# Patient Record
Sex: FEMALE | URBAN - METROPOLITAN AREA
[De-identification: names, ages, dates, MRNs, and addresses within clinical notes are randomized per-mention and may not be internally consistent; named-entity substitution may affect disease eponyms.]

---

## 2020-09-17 ENCOUNTER — IMPORTED ENCOUNTER (OUTPATIENT)
Dept: URBAN - METROPOLITAN AREA CLINIC 9 | Facility: CLINIC | Age: 30
End: 2020-09-17

## 2021-02-23 ENCOUNTER — CLINICAL SUPPORT (OUTPATIENT)
Dept: OCCUPATIONAL MEDICINE | Facility: URGENT CARE | Age: 31
End: 2021-02-23

## 2021-02-23 DIAGNOSIS — Z02.1 PRE-EMPLOYMENT EXAMINATION: ICD-10-CM

## 2021-02-23 PROCEDURE — 97799 UNLISTED PHYSCL MED/REHAB PX: CPT | Mod: FIT | Performed by: STUDENT IN AN ORGANIZED HEALTH CARE EDUCATION/TRAINING PROGRAM

## 2021-04-18 ENCOUNTER — OFFICE VISIT (OUTPATIENT)
Dept: URGENT CARE | Facility: URGENT CARE | Age: 31
End: 2021-04-18

## 2021-04-18 VITALS
HEIGHT: 66 IN | WEIGHT: 260 LBS | SYSTOLIC BLOOD PRESSURE: 154 MMHG | RESPIRATION RATE: 16 BRPM | HEART RATE: 99 BPM | OXYGEN SATURATION: 96 % | BODY MASS INDEX: 41.78 KG/M2 | DIASTOLIC BLOOD PRESSURE: 98 MMHG | TEMPERATURE: 98.1 F

## 2021-04-18 DIAGNOSIS — R11.2 NAUSEA AND VOMITING, INTRACTABILITY OF VOMITING NOT SPECIFIED, UNSPECIFIED VOMITING TYPE: Primary | ICD-10-CM

## 2021-04-18 PROCEDURE — 99203 OFFICE O/P NEW LOW 30 MIN: CPT | Performed by: PHYSICIAN ASSISTANT

## 2021-04-18 RX ORDER — LISINOPRIL 20 MG/1
20 TABLET ORAL DAILY
COMMUNITY

## 2021-04-18 RX ORDER — ONDANSETRON HYDROCHLORIDE 8 MG/1
8 TABLET, FILM COATED ORAL EVERY 8 HOURS PRN
Qty: 20 TABLET | Refills: 1 | Status: SHIPPED | OUTPATIENT
Start: 2021-04-18 | End: 2021-04-25

## 2021-04-18 ASSESSMENT — PAIN SCALES - GENERAL: PAINLEVEL: 7

## 2021-04-19 NOTE — PROGRESS NOTES
"Subjective      Abby Brothers is a 31 y.o. female who presents complaint of nausea and vomiting that started last night and has developed watery diarrhea this morning.  Patient reports did not eat anything that would obviously cause this.  Has not eaten anything today but has had dry heaves.  Has not taken anything for symptoms.  No significant abdominal pain no fever chills.  Does not know anybody with similar symptoms..    HPI    The following have been reviewed and updated as appropriate in this visit:         Allergies   Allergen Reactions   • Penicillins Rash   • Sulfamethoxazole-Trimethoprim Rash     Current Outpatient Medications   Medication Sig Dispense Refill   • lisinopriL (PRINIVIL,ZESTRIL) 20 mg tablet Take 20 mg by mouth daily     • sertraline HCl (ZOLOFT ORAL) Take 150 mg by mouth     • ondansetron (Zofran) 8 mg tablet Take 1 tablet (8 mg total) by mouth every 8 (eight) hours as needed for nausea or vomiting for up to 7 days 20 tablet 1     No current facility-administered medications for this visit.     No past medical history on file.  Past Surgical History:   Procedure Laterality Date   • CHOLECYSTECTOMY     • TONSILLECTOMY AND ADENOIDECTOMY       No family history on file.  Social History     Occupational History   • Not on file   Tobacco Use   • Smoking status: Never Smoker   • Smokeless tobacco: Never Used   Substance and Sexual Activity   • Alcohol use: Not on file   • Drug use: Not on file   • Sexual activity: Not on file   Social History Narrative   • Not on file       Review of Systems    Objective   /98 (BP Location: Right arm, Patient Position: Sitting, Cuff Size: Large Adult)   Pulse 99   Temp 36.7 °C (98.1 °F) (Temporal)   Resp 16   Ht 1.676 m (5' 6\")   Wt 117.9 kg (260 lb)   SpO2 96%   BMI 41.97 kg/m²     Physical Exam  Vitals and nursing note reviewed.   Constitutional:       General: She is not in acute distress.     Appearance: Normal appearance. She is not " ill-appearing, toxic-appearing or diaphoretic.   Cardiovascular:      Rate and Rhythm: Normal rate and regular rhythm.      Heart sounds: Normal heart sounds.   Pulmonary:      Effort: Pulmonary effort is normal.      Breath sounds: Normal breath sounds. No wheezing, rhonchi or rales.   Abdominal:      General: Bowel sounds are normal. There is no distension.      Palpations: Abdomen is soft. There is no mass.      Tenderness: There is no abdominal tenderness. There is no guarding or rebound.   Neurological:      Mental Status: She is alert.   Psychiatric:         Mood and Affect: Mood normal.         Behavior: Behavior normal.         Thought Content: Thought content normal.         Judgment: Judgment normal.         Assessment/Plan   Diagnoses and all orders for this visit:    Nausea and vomiting, intractability of vomiting not specified, unspecified vomiting type  -     ondansetron (Zofran) 8 mg tablet; Take 1 tablet (8 mg total) by mouth every 8 (eight) hours as needed for nausea or vomiting for up to 7 days    Discussed with patient very likely viral gastroenteritis as opposed to food poisoning.  At this time will prescribe her Zofran for nausea and vomiting.  Discussed oral rehydration therapy and progressing with bland diet.  If diarrhea persists may use Imodium and/or Pepto-Bismol.  Should symptoms continue another 3 to 5 days return to clinic for further evaluation.    JOSEFA ORTIZ

## 2021-05-04 NOTE — PROGRESS NOTES
Subjective   New Dermatology Skin examination  Abby Brothers is a 31 y.o. female who presents for an examination of her face. . Lesions of concern include: swollen, red, flaky face and backs of her fingers.  Patient does endorse a history of tanning bed use.  Patient skin history: Negative for personal history of non melanoma skin cancer, Negative for actinic keratosis, Negative personal history of melanoma,  Negative family history of melanoma.            Review of Systems  Review of Systems   Constitutional: Negative for fatigue and fever.   Skin: Positive for rash.   Allergic/Immunologic: Negative for environmental allergies and immunocompromised state.   Hematological: Does not bruise/bleed easily.   All other systems reviewed and are negative.        History reviewed. No pertinent past medical history.       Past Surgical History:   Procedure Laterality Date   • CHOLECYSTECTOMY     • TONSILLECTOMY AND ADENOIDECTOMY           family history is not on file.      Medications  Current Outpatient Medications on File Prior to Visit   Medication Sig Dispense Refill   • lisinopriL (PRINIVIL,ZESTRIL) 20 mg tablet Take 20 mg by mouth daily     • sertraline HCl (ZOLOFT ORAL) Take 150 mg by mouth       No current facility-administered medications on file prior to visit.       Allergies  Penicillins and Sulfamethoxazole-trimethoprim      Physical Examination    Vital Signs /74 (BP Location: Left arm, Patient Position: Sitting, Cuff Size: Large Long Adult)   Pulse 95   Temp (!) 35.7 °C (96.3 °F) (Temporal)       Physical Exam Findings:   Constitutional: General Appearance: healthy-appearing, well-nourished, and well-developed. Level of Distress: NAD. Ambulation: ambulating normally.  Psychiatric: Insight: good judgement. Mental Status: normal mood and affect and active and alert. Orientation: to time, place, and person. Memory: recent memory normal and remote memory normal.  Head: normocephalic and atraumatic.  Eyes:  Lids and Conjunctivae: no discharge or pallor and non-injected. Lens: clear. Sclerae: non-icteric.  Neurologic: Gait and Station: normal gait and station. Cranial Nerves: grossly intact. Coordination and Cerebellum: no tremor.      A focused skin examination was performed including face, lips, mouth, ears, neck, both hands  Yang skin type:II     Face: Bright red smooth papules     Erythema with mild scale of the face and ears and the fingers between the MCPs and PIPs bilaterally.  Recommended patch testing done if the condition becomes persistent.        Abby was seen today for skin problem.    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  If it becomes more frequent she may consider patch testing.      Vanicream products recommended until it settles down.      I emphasized daily sunscreen use with an SPF of 30-50, broad spectrum and water resistant.  I directed reapplication every two hours.  I recommended wide brimmed hats.  Finally, we discussed danger signs of changing skin lesions including sores not healing and moles changing in size, shape or color.  Should any of these lesions occur before next appointment, I instructed patent to call sooner for evaluation.        No follow-ups on file.

## 2021-05-04 NOTE — PATIENT INSTRUCTIONS
"Avoid sunlight between the hours of 10 am and 2 pm, wear a broad spectrum, water resistant sunscreen with SPF of 30-50 year round. Reapply sunscreen every 2 hours and after exercising or swimming. Wearing a 6\" broad brimmed hat, a lip sunblock of SPF of 30-50, and sun protective clothing is also suggested year round.   Recommended sunscreens include Neutrogena Ultra Sheer Dry Touch SPF 50 or higher, Neutrogena Sheer Zinc Dry Touch SPF 50, and Vanicream Sport.  ---------------------------------------------------------------------  Monthly self-examination of your skin is important. Should any lesions, including moles, change in size, shape, color, itch, bleed or burn, contact our office for sooner evaluation.  ----------------------------------------------------------------------  **[employer] is our survey company. You will be receiving a survey about your care. Your satisfaction is important to us and we are always looking for opportunities to improve your experience. If you come across questions in the survey that are not applicable to your visit, please leave these questions blank. Your identity is kept confidential so please be honest! Thank you!**    Dry/Irritated Skin Care  Use comfortably cool water when taking a bath or shower.  Avoid very hot water, as this tends to irritate and further dry the skin leading to worsening of rashes and increase in itching.     Use very gentle soaps when bathing or showering.  Avoid soaps containing fragrances, dyes, aloe, lanolin, vitamin E or cocoa butter.  Soap recommendations:              Vanicream Cleansing Bar  Cetaphil Gentle Skin Cleanser              Unscented Dove              CeraVe Hydrating Cleanser  Eucerin Dry, Sensitive Skin Advanced Cleansing Body and Face Cleanser     Pat the skin dry after coming out of the bath or shower.  Do not rub or scrub vigorously with your towel.     Apply a moisturizer while the skin is still damp.  Avoid moisturizers containing " fragrances, dyes, aloe, lanolin, vitamin E or cocoa butter.    Moisturizer recommendations:              Vanicream Moisturizing Skin Cream              Vanicream Lite Lotion              Cetaphil Moisturizing Cream              CeraVe Moisturizing Cream              Aveeno [place]              Plain Vaseline  For the face: Neutrogena oil-free moisturizer, CeraVe or Cetaphil facial moisturizer that is safe for all skin types are recommended. If you have very dry skin you may need a thicker moisturizer (as listed above) for the face as well.     Apply a moisturizer 2-3 times daily.     Use unscented laundry detergent.  Avoid laundry detergents with fragrances and dyes.  Laundry detergent recommendations:                            All Free and Clear              Do not use fabric softener dryer sheets.    Recommend cotton clothing    Diagnoses and all orders for this visit:    Irritant contact dermatitis, unspecified trigger  -     hydrocortisone 2.5 % ointment; Apply topically 2 (two) times a day  -     tacrolimus (Protopic) 0.1 % ointment; Apply 1 application topically 2 (two) times a day

## 2021-05-05 ENCOUNTER — OFFICE VISIT (OUTPATIENT)
Dept: DERMATOLOGY | Facility: CLINIC | Age: 31
End: 2021-05-05
Payer: COMMERCIAL

## 2021-05-05 VITALS — SYSTOLIC BLOOD PRESSURE: 145 MMHG | TEMPERATURE: 96.3 F | DIASTOLIC BLOOD PRESSURE: 74 MMHG | HEART RATE: 95 BPM

## 2021-05-05 DIAGNOSIS — D18.01 CHERRY ANGIOMA: ICD-10-CM

## 2021-05-05 DIAGNOSIS — L24.9 IRRITANT CONTACT DERMATITIS, UNSPECIFIED TRIGGER: Primary | ICD-10-CM

## 2021-05-05 PROCEDURE — 99204 OFFICE O/P NEW MOD 45 MIN: CPT | Performed by: DERMATOLOGY

## 2021-05-05 RX ORDER — TACROLIMUS 1 MG/G
1 OINTMENT TOPICAL 2 TIMES DAILY
Qty: 100 G | Refills: 0 | Status: SHIPPED | OUTPATIENT
Start: 2021-05-05 | End: 2022-05-05

## 2021-05-05 RX ORDER — HYDROCORTISONE 25 MG/G
OINTMENT TOPICAL 2 TIMES DAILY
Qty: 80 G | Refills: 2 | Status: SHIPPED | OUTPATIENT
Start: 2021-05-05 | End: 2022-05-05

## 2021-05-05 ASSESSMENT — ENCOUNTER SYMPTOMS
FATIGUE: 0
FEVER: 0
BRUISES/BLEEDS EASILY: 0

## 2021-05-05 ASSESSMENT — PAIN SCALES - GENERAL: PAINLEVEL: 0-NO PAIN

## 2021-10-17 ASSESSMENT — TONOMETRY
OD_IOP_MMHG: 13
OS_IOP_MMHG: 13

## 2021-10-17 ASSESSMENT — VISUAL ACUITY
OS_SC: 20/25 - SN
OD_SC: 20/25 SN

## 2022-07-01 RX ORDER — ZOLPIDEM TARTRATE 5 MG/1
TABLET ORAL
COMMUNITY
Start: 2022-03-10

## 2022-07-01 RX ORDER — SERTRALINE HYDROCHLORIDE 100 MG/1
TABLET, FILM COATED ORAL
COMMUNITY

## 2022-07-01 RX ORDER — LISINOPRIL 20 MG/1
TABLET ORAL
COMMUNITY

## 2022-07-01 RX ORDER — CITALOPRAM 20 MG/1
TABLET ORAL
COMMUNITY
Start: 2022-03-23